# Patient Record
Sex: MALE | Race: WHITE | Employment: UNEMPLOYED | URBAN - METROPOLITAN AREA
[De-identification: names, ages, dates, MRNs, and addresses within clinical notes are randomized per-mention and may not be internally consistent; named-entity substitution may affect disease eponyms.]

---
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
## 2024-08-11 NOTE — PROGRESS NOTES
[facility]  Sleep Medicine Clinic initial consultation note    Roque Marcos                                                Chief complaint: Roque Marcos is a 18 y.o.oldmale came for further evaluation regarding his ?sleep apnea  with referral from Daphne Lehman, APRN - CNP.      [place]:    Sleep/Wake schedule:  Usual time to go to bed during the work/regular day of week: 10 PM  Usual time to wake up during the work//regular day of week: 6:40 AM  Over the weekends his sleep schedule:.  [x]phase delayed.  He sleeps up to 12 to 13 hours on the weekends.  He feels better on the weekends whenever he sleeps long time.  He usually falls a sleep in less than: Less than 20 minutes  He takes naps: No.       Sleep Hygiene:  Is the temperature and evironment in his bed room is acceptable to him: Yes.   He watches Television in his bed room: Yes.   He read books, study, pay bills etc in the bed: No.   Frequency He wake up during night/sleep: 1 time  Majority of nocturnal awakenings are for urination: Yes.   Difficulty in falling back to sleep after nocturnal awakenings: No  .  Do you drink coffee: Yes.  He drinks 1 to 2 cups of coffee per day in the morning.      Do you drink caffeinated beverages i.e sodas: Yes.  He drinks 5 cans of caffeinated soda per day.  He drinks DrPetr Pepper.  Do you drink tea: No.     Do you drink alcoholic beverages: Yes.  He drinks 2 beers per day in the evening.  History of recreational drug use: Yes.  He smokes marijuana on daily basis.  He smokes 1 joint of marijuana per day    Social History     Tobacco Use    Smoking status: Never     Passive exposure: Never    Smokeless tobacco: Never   Vaping Use    Vaping status: Never Used   Substance Use Topics    Alcohol use: Yes    Drug use: Yes     Types: Marijuana (Weed)     Comment: smokes daily       Sleep apnea symptoms:  Noticed to have loud snoring:Yes. Noted by his family

## 2024-09-06 ENCOUNTER — OFFICE VISIT (OUTPATIENT)
Dept: PULMONOLOGY | Age: 19
End: 2024-09-06

## 2024-09-06 VITALS
OXYGEN SATURATION: 98 % | TEMPERATURE: 98.1 F | HEIGHT: 71 IN | BODY MASS INDEX: 23.8 KG/M2 | WEIGHT: 170 LBS | DIASTOLIC BLOOD PRESSURE: 82 MMHG | HEART RATE: 71 BPM | SYSTOLIC BLOOD PRESSURE: 124 MMHG

## 2024-09-06 DIAGNOSIS — G47.10 HYPERSOMNIA: Primary | ICD-10-CM

## 2024-09-06 DIAGNOSIS — R06.83 SNORING: ICD-10-CM

## 2024-09-06 DIAGNOSIS — F12.90 MARIJUANA SMOKER: ICD-10-CM

## 2024-09-06 DIAGNOSIS — R06.81 APNEA: ICD-10-CM

## 2024-09-06 PROCEDURE — 99204 OFFICE O/P NEW MOD 45 MIN: CPT | Performed by: INTERNAL MEDICINE

## 2024-09-06 RX ORDER — VENLAFAXINE HYDROCHLORIDE 37.5 MG/1
112.5 CAPSULE, EXTENDED RELEASE ORAL DAILY
COMMUNITY
Start: 2024-08-05

## 2024-09-06 NOTE — PROGRESS NOTES
Chief Complaint: New patient, referred by Daphne Tomlinson. No priors     Mallampati airway Class: 3  Neck Circumference: 12.5 Inches    Ponce sleepiness score 9/6/24: 7

## 2024-09-06 NOTE — PATIENT INSTRUCTIONS
Recommendations/Plan:  -Will schedule patient for polysomnogram in the sleep lab to evaluate for obstructive VS central sleep apnea due to narcotic medication-induced (he is currently smoking marijuana).   -I had a discussion with patient regarding avialable treatment options for his sleep disorder breathing including but not limited to CPAP titration in the sleep lab Vs.Dental appliance placement with referral to a local dentist Vs other available surgical options including Uvulopalatopharyngoplasty, maxillomandibular ostomy,Inspire device placement and tracheostomy as last option. At the end of discussion, he is not decided on his   treatment if he found to have obstructive sleep apnea at this time.  -We will see Roque Marcos back in 1week after the sleep study to go over the sleep study results and further management options.  -He was educated to practice good sleep hygiene practices. He was provided with a good sleep hygiene hand out.  -Patient educated to quit smoking Marijuana as soon as possible. Patient verbalizes understanding of adverse consequences of Marijuana smoking.  -Roque was advised to make earlier appointment with my clinic if he develops any worsening of sleep symptoms. He verbalizes understanding.  -Roque was advised to not to drive any motor vehicles or operate heavy equipment until his sleep symptoms are under good control.Roque Priti verbalizes understanding.  -Roque Marcos was educated about my impression and plan. He verbalizes understanding.

## 2024-10-10 ENCOUNTER — TELEPHONE (OUTPATIENT)
Dept: SLEEP CENTER | Age: 19
End: 2024-10-10

## 2024-10-10 NOTE — TELEPHONE ENCOUNTER
Please cx Roque's f/u appt.  He cx his sleep study.  He feels his sleep problems have improved.        Thank you,  Siobhan

## 2024-10-10 NOTE — TELEPHONE ENCOUNTER
Attempted to call patient to make sure he would like to cancel follow up but voicemail is full. I cancelled patient's follow up.

## 2025-05-14 ENCOUNTER — APPOINTMENT (OUTPATIENT)
Dept: CT IMAGING | Age: 20
End: 2025-05-14
Payer: COMMERCIAL

## 2025-05-14 ENCOUNTER — HOSPITAL ENCOUNTER (EMERGENCY)
Age: 20
Discharge: HOME | End: 2025-05-15
Payer: COMMERCIAL

## 2025-05-14 DIAGNOSIS — K59.00 CONSTIPATION, UNSPECIFIED CONSTIPATION TYPE: Primary | ICD-10-CM

## 2025-05-14 LAB
BASOPHILS ABSOLUTE: 0 THOU/MM3 (ref 0–0.1)
BASOPHILS NFR BLD AUTO: 0.5 %
DEPRECATED RDW RBC AUTO: 41.8 FL (ref 35–45)
EOSINOPHIL NFR BLD AUTO: 3.5 %
EOSINOPHILS ABSOLUTE: 0.3 THOU/MM3 (ref 0–0.4)
ERYTHROCYTE [DISTWIDTH] IN BLOOD BY AUTOMATED COUNT: 12.7 % (ref 11.5–14.5)
HCT VFR BLD AUTO: 43.2 % (ref 42–52)
HGB BLD-MCNC: 15.4 GM/DL (ref 14–18)
IMM GRANULOCYTES # BLD AUTO: 0.02 THOU/MM3 (ref 0–0.07)
IMM GRANULOCYTES NFR BLD AUTO: 0.3 %
LYMPHOCYTES ABSOLUTE: 2.6 THOU/MM3 (ref 1–4.8)
LYMPHOCYTES NFR BLD AUTO: 33.9 %
MCH RBC QN AUTO: 32 PG (ref 26–33)
MCHC RBC AUTO-ENTMCNC: 35.6 GM/DL (ref 32.2–35.5)
MCV RBC AUTO: 89.6 FL (ref 80–94)
MONOCYTES ABSOLUTE: 0.6 THOU/MM3 (ref 0.4–1.3)
MONOCYTES NFR BLD AUTO: 8.2 %
NEUTROPHILS ABSOLUTE: 4.1 THOU/MM3 (ref 1.8–7.7)
NEUTROPHILS NFR BLD AUTO: 53.6 %
NRBC BLD AUTO-RTO: 0 /100 WBC
PLATELET # BLD AUTO: 245 THOU/MM3 (ref 130–400)
PMV BLD AUTO: 11.5 FL (ref 9.4–12.4)
RBC # BLD AUTO: 4.82 MILL/MM3 (ref 4.7–6.1)
WBC # BLD AUTO: 7.7 THOU/MM3 (ref 4.8–10.8)

## 2025-05-14 PROCEDURE — 74177 CT ABD & PELVIS W/CONTRAST: CPT

## 2025-05-14 PROCEDURE — 83690 ASSAY OF LIPASE: CPT

## 2025-05-14 PROCEDURE — 6360000004 HC RX CONTRAST MEDICATION: Performed by: NURSE PRACTITIONER

## 2025-05-14 PROCEDURE — 85025 COMPLETE CBC W/AUTO DIFF WBC: CPT

## 2025-05-14 PROCEDURE — 96374 THER/PROPH/DIAG INJ IV PUSH: CPT

## 2025-05-14 PROCEDURE — 99285 EMERGENCY DEPT VISIT HI MDM: CPT

## 2025-05-14 PROCEDURE — 80053 COMPREHEN METABOLIC PANEL: CPT

## 2025-05-14 PROCEDURE — 6360000002 HC RX W HCPCS: Performed by: NURSE PRACTITIONER

## 2025-05-14 PROCEDURE — 82248 BILIRUBIN DIRECT: CPT

## 2025-05-14 PROCEDURE — 2580000003 HC RX 258: Performed by: NURSE PRACTITIONER

## 2025-05-14 PROCEDURE — 36415 COLL VENOUS BLD VENIPUNCTURE: CPT

## 2025-05-14 RX ORDER — ONDANSETRON 2 MG/ML
4 INJECTION INTRAMUSCULAR; INTRAVENOUS ONCE
Status: COMPLETED | OUTPATIENT
Start: 2025-05-14 | End: 2025-05-14

## 2025-05-14 RX ORDER — IOPAMIDOL 755 MG/ML
80 INJECTION, SOLUTION INTRAVASCULAR
Status: COMPLETED | OUTPATIENT
Start: 2025-05-14 | End: 2025-05-14

## 2025-05-14 RX ORDER — 0.9 % SODIUM CHLORIDE 0.9 %
1000 INTRAVENOUS SOLUTION INTRAVENOUS ONCE
Status: COMPLETED | OUTPATIENT
Start: 2025-05-14 | End: 2025-05-15

## 2025-05-14 RX ADMIN — ONDANSETRON 4 MG: 2 INJECTION, SOLUTION INTRAMUSCULAR; INTRAVENOUS at 23:42

## 2025-05-14 RX ADMIN — SODIUM CHLORIDE 1000 ML: 0.9 INJECTION, SOLUTION INTRAVENOUS at 23:43

## 2025-05-14 RX ADMIN — IOPAMIDOL 80 ML: 755 INJECTION, SOLUTION INTRAVENOUS at 23:27

## 2025-05-14 ASSESSMENT — PAIN DESCRIPTION - ORIENTATION: ORIENTATION: LOWER

## 2025-05-14 ASSESSMENT — PAIN SCALES - GENERAL: PAINLEVEL_OUTOF10: 6

## 2025-05-14 ASSESSMENT — PAIN DESCRIPTION - LOCATION: LOCATION: ABDOMEN

## 2025-05-14 ASSESSMENT — PAIN - FUNCTIONAL ASSESSMENT: PAIN_FUNCTIONAL_ASSESSMENT: 0-10

## 2025-05-15 VITALS
SYSTOLIC BLOOD PRESSURE: 136 MMHG | HEART RATE: 84 BPM | DIASTOLIC BLOOD PRESSURE: 80 MMHG | BODY MASS INDEX: 21 KG/M2 | RESPIRATION RATE: 17 BRPM | HEIGHT: 71 IN | WEIGHT: 150 LBS | OXYGEN SATURATION: 95 % | TEMPERATURE: 98.2 F

## 2025-05-15 LAB
ALBUMIN SERPL BCG-MCNC: 4.6 G/DL (ref 3.4–4.9)
ALP SERPL-CCNC: 111 U/L (ref 40–129)
ALT SERPL W/O P-5'-P-CCNC: 49 U/L (ref 10–50)
ANION GAP SERPL CALC-SCNC: 11 MEQ/L (ref 8–16)
AST SERPL-CCNC: 41 U/L (ref 10–50)
BACTERIA URNS QL MICRO: ABNORMAL /HPF
BILIRUB CONJ SERPL-MCNC: 0.2 MG/DL (ref 0–0.2)
BILIRUB SERPL-MCNC: 0.5 MG/DL (ref 0.3–1.2)
BILIRUB UR QL STRIP.AUTO: NEGATIVE
BUN SERPL-MCNC: 14 MG/DL (ref 8–23)
CALCIUM SERPL-MCNC: 9.3 MG/DL (ref 8.6–10)
CASTS #/AREA URNS LPF: ABNORMAL /LPF
CASTS 2: ABNORMAL /LPF
CHARACTER UR: ABNORMAL
CHLORIDE SERPL-SCNC: 100 MEQ/L (ref 98–111)
CO2 SERPL-SCNC: 28 MEQ/L (ref 22–29)
COLOR, UA: YELLOW
CREAT SERPL-MCNC: 1.1 MG/DL (ref 0.7–1.2)
CRYSTALS URNS MICRO: ABNORMAL
EPITHELIAL CELLS, UA: ABNORMAL /HPF
GFR SERPL CREATININE-BSD FRML MDRD: > 90 ML/MIN/1.73M2
GLUCOSE SERPL-MCNC: 105 MG/DL (ref 74–109)
GLUCOSE UR QL STRIP.AUTO: NEGATIVE MG/DL
HGB UR QL STRIP.AUTO: NEGATIVE
KETONES UR QL STRIP.AUTO: NEGATIVE
LIPASE SERPL-CCNC: 27 U/L (ref 13–60)
MISCELLANEOUS 2: ABNORMAL
NITRITE UR QL STRIP: NEGATIVE
OSMOLALITY SERPL CALC.SUM OF ELEC: 278.4 MOSMOL/KG (ref 275–300)
PH UR STRIP.AUTO: 8.5 [PH] (ref 5–9)
POTASSIUM SERPL-SCNC: 3.6 MEQ/L (ref 3.5–5.2)
PROT SERPL-MCNC: 7.3 G/DL (ref 6.4–8.3)
PROT UR STRIP.AUTO-MCNC: NEGATIVE MG/DL
RBC URINE: ABNORMAL /HPF
RENAL EPI CELLS #/AREA URNS HPF: ABNORMAL /[HPF]
SODIUM SERPL-SCNC: 139 MEQ/L (ref 135–145)
SP GR UR REFRACT.AUTO: > 1.03 (ref 1–1.03)
UROBILINOGEN, URINE: 1 EU/DL (ref 0–1)
WBC #/AREA URNS HPF: ABNORMAL /HPF
WBC #/AREA URNS HPF: NEGATIVE /[HPF]
YEAST LIKE FUNGI URNS QL MICRO: ABNORMAL

## 2025-05-15 PROCEDURE — 6370000000 HC RX 637 (ALT 250 FOR IP): Performed by: NURSE PRACTITIONER

## 2025-05-15 PROCEDURE — 81001 URINALYSIS AUTO W/SCOPE: CPT

## 2025-05-15 RX ORDER — LACTULOSE 10 G/15ML
10-20 SOLUTION ORAL 2 TIMES DAILY PRN
Qty: 300 ML | Refills: 0 | Status: SHIPPED | OUTPATIENT
Start: 2025-05-15

## 2025-05-15 RX ADMIN — MAJOR MAGNESIUM CITRATE ORAL SOLUTION - LEMON 296 ML: 1.75 LIQUID ORAL at 01:26

## 2025-05-15 NOTE — ED NOTES
Urine sample obtained. Respirations even and unlabored. Call light within reach. Will monitor. No further needs voiced.

## 2025-05-15 NOTE — DISCHARGE INSTRUCTIONS
Take your medication as indicated and prescribed.  Take either milk of magnesium or 1/2 bottle of magnesium citrate if no bowel movement.  Eat foods high in fiber, or start using fiber supplements.    PLEASE RETURN TO THE EMERGENCY DEPARTMENT IMMEDIATELY for worsening symptoms, inability to have a bowel movement or if you develop any concerning symptoms such as: high fever not relieved by acetaminophen (Tylenol) and/or ibuprofen (Motrin / Advil), chills, shortness of breath, chest pain, feeling of your heart fluttering or racing, persistent nausea and/or vomiting, vomiting up blood, blood in your stool, numbness, loss of consciousness, weakness or tingling in the arms or legs or change in color of the extremities, changes in mental status, persistent headache, blurry vision, loss of bladder / bowel control, unable to follow up with your physician, or other any other care or concern.  .

## 2025-05-15 NOTE — ED TRIAGE NOTES
Pt reports to the ED due to nuasea and constipation. Pt reports symptoms starting a couple days ago and have gotten worse. Pt reports nausea with pain in abdomen rating pain 6/10 at worst but describing pain as coming and going. Pain located in lower abdomen and unable to state if is left or right side. Pt last bowel movement earlier today but was small. Pt reports passing gas but states is not normal. INT established.

## 2025-05-15 NOTE — ED PROVIDER NOTES
[facility] EMERGENCY DEPARTMENT      EMERGENCY MEDICINE     Pt Name: Roque Marcos  MRN: [id number]  Birthdate 2005  Date of evaluation: 5/14/2025  Provider: AMALIA Rolle CNP    CHIEF COMPLAINT       Chief Complaint   Patient presents with    Constipation    Nausea     HISTORY OF PRESENT ILLNESS   Roque Marcos is a pleasant 19 y.o. male who presents to the emergency department from home with c/o abdominal bloating,discomfort, cramping and constipation.  It is causing him to be nauseated and not feel like eating.  No fever.  Has hx of constipation      History is obtained from:  patient  PASTMEDICAL HISTORY   History reviewed. No pertinent past medical history.    There is no problem list on file for this patient.    SURGICAL HISTORY     History reviewed. No pertinent surgical history.    CURRENT MEDICATIONS       Discharge Medication List as of 5/15/2025  1:04 AM        CONTINUE these medications which have NOT CHANGED    Details   venlafaxine (EFFEXOR XR) 37.5 MG extended release capsule Take 3 capsules by mouth dailyHistorical Med      busPIRone (BUSPAR) 10 MG tablet Take 1 tablet by mouth 3 times dailyHistorical Med             ALLERGIES     has no known allergies.    FAMILY HISTORY     has no family status information on file.        SOCIAL HISTORY       Social History     Tobacco Use    Smoking status: Never     Passive exposure: Never    Smokeless tobacco: Never   Vaping Use    Vaping status: Never Used   Substance Use Topics    Alcohol use: Yes    Drug use: Yes     Types: Marijuana (Weed)     Comment: smokes daily       PHYSICAL EXAM       ED Triage Vitals [05/14/25 2302]   BP Systolic BP Percentile Diastolic BP Percentile Temp Temp Source Pulse Respirations SpO2   (!) 144/103 -- -- 98.2 °F (36.8 °C) Oral (!) 124 18 99 %      Height Weight - Scale         1.803 m (5' 11\") 68 kg (150 lb)             Physical Exam  Vitals and nursing note reviewed.   Constitutional:        as of 5/15/2025  1:04 AM        START taking these medications    Details   lactulose (CHRONULAC) 10 GM/15ML solution Take 15-30 mLs by mouth 2 times daily as needed (constipation), Disp-300 mL, R-0Normal             FINAL IMPRESSION      1. Constipation, unspecified constipation type          DISPOSITION/PLAN   DISPOSITION Decision To Discharge 05/15/2025 12:52:26 AM   DISPOSITION CONDITION Stable           OUTPATIENT FOLLOW UP THE PATIENT:  Daphne Tomlinson APRN - CNP  [address] [facility] [address] [phone]    Schedule an appointment as soon as possible for a visit in 2 days  For follow up      AMALIA Rolle CNP, Kristy J, APRN - CNP  05/15/25 0541

## 2025-06-21 ENCOUNTER — HOSPITAL ENCOUNTER (EMERGENCY)
Age: 20
Discharge: HOME | End: 2025-06-21
Attending: EMERGENCY MEDICINE

## 2025-06-21 ENCOUNTER — APPOINTMENT (OUTPATIENT)
Dept: ULTRASOUND IMAGING | Age: 20
End: 2025-06-21

## 2025-06-21 VITALS
OXYGEN SATURATION: 99 % | TEMPERATURE: 98 F | HEART RATE: 76 BPM | SYSTOLIC BLOOD PRESSURE: 113 MMHG | DIASTOLIC BLOOD PRESSURE: 65 MMHG | RESPIRATION RATE: 19 BRPM

## 2025-06-21 DIAGNOSIS — I86.1 VARICOCELE: ICD-10-CM

## 2025-06-21 DIAGNOSIS — N50.811 PAIN IN RIGHT TESTICLE: Primary | ICD-10-CM

## 2025-06-21 LAB
BACTERIA URNS QL MICRO: ABNORMAL /HPF
BILIRUB UR QL STRIP.AUTO: NEGATIVE
CASTS #/AREA URNS LPF: ABNORMAL /LPF
CASTS 2: ABNORMAL /LPF
CHARACTER UR: CLEAR
COLOR, UA: YELLOW
CRYSTALS URNS MICRO: ABNORMAL
EPITHELIAL CELLS, UA: ABNORMAL /HPF
GLUCOSE UR QL STRIP.AUTO: NEGATIVE MG/DL
HGB UR QL STRIP.AUTO: NEGATIVE
KETONES UR QL STRIP.AUTO: NEGATIVE
MISCELLANEOUS 2: ABNORMAL
NITRITE UR QL STRIP: NEGATIVE
PH UR STRIP.AUTO: 6.5 [PH] (ref 5–9)
PROT UR STRIP.AUTO-MCNC: 30 MG/DL
RBC URINE: ABNORMAL /HPF
RENAL EPI CELLS #/AREA URNS HPF: ABNORMAL /[HPF]
SP GR UR REFRACT.AUTO: 1.02 (ref 1–1.03)
UROBILINOGEN, URINE: 1 EU/DL (ref 0–1)
WBC #/AREA URNS HPF: ABNORMAL /HPF
WBC #/AREA URNS HPF: NEGATIVE /[HPF]
YEAST LIKE FUNGI URNS QL MICRO: ABNORMAL

## 2025-06-21 PROCEDURE — 81001 URINALYSIS AUTO W/SCOPE: CPT

## 2025-06-21 PROCEDURE — 76870 US EXAM SCROTUM: CPT

## 2025-06-21 PROCEDURE — 99284 EMERGENCY DEPT VISIT MOD MDM: CPT

## 2025-06-21 ASSESSMENT — PAIN SCALES - GENERAL: PAINLEVEL_OUTOF10: 5

## 2025-06-21 ASSESSMENT — PAIN - FUNCTIONAL ASSESSMENT: PAIN_FUNCTIONAL_ASSESSMENT: 0-10

## 2025-06-21 NOTE — ED NOTES
Pt arrives to ED from home with c/o testicular pain  Pt states after \"fucking\" he began to experience right side testicular pain and pain is anus as well. He states the ride side feels different the left

## 2025-06-21 NOTE — ED NOTES
RN to room to round on pt   UA collected  No further questions or concerns at this time  Patient resting in bed. Respirations easy and unlabored. No distress noted. Call light within reach.

## 2025-06-21 NOTE — ED PROVIDER NOTES
[facility] EMERGENCY SERVICES ENCOUNTER        PATIENT NAME: Roque Marcos  MRN: [id number]  : 2005  [place]: 2025  PROVIDER: Mumtaz Shi MD    Patient was seen and evaluated at 3:52 PM EDT. Nurses Notes are reviewed and I agree except as noted in the HPI.  Chief Complaint   Patient presents with    Groin Swelling     HISTORY OF PRESENT ILLNESS     A 19-year-old otherwise healthy male presents with a right testicle pain after intercourse today. Pain radiates to right sided perineal area.  No testicle or scrotal swelling.  No urethral discharge.  No hematuria or dysuria.  He denies prior history of STI.    Patient's additional PMH, PSH, current medications, allergies, FH and SH are reviewed as below.     PAST MEDICAL HISTORY    has no past medical history on file.    PAST SURGICAL HISTORY    has no past surgical history on file.    CURRENT MEDICATIONS       Previous Medications    BUSPIRONE (BUSPAR) 10 MG TABLET    Take 1 tablet by mouth 3 times daily    LACTULOSE (CHRONULAC) 10 GM/15ML SOLUTION    Take 15-30 mLs by mouth 2 times daily as needed (constipation)    VENLAFAXINE (EFFEXOR XR) 37.5 MG EXTENDED RELEASE CAPSULE    Take 3 capsules by mouth daily       ALLERGIES     has no known allergies.    FAMILY HISTORY   has no family status information on file.    family history is not on file.    SOCIAL HISTORY    reports that he has never smoked. He has never been exposed to tobacco smoke. He has never used smokeless tobacco. He reports current alcohol use. He reports current drug use. Drug: Marijuana (Weed).    PHYSICAL EXAM      oral temperature is 98 °F (36.7 °C). His blood pressure is 113/65 and his pulse is 76. His respiration is 19 and oxygen saturation is 99%.   Physical Exam  Vitals and nursing note reviewed.   Constitutional:       Appearance: Normal appearance. He is well-developed.   HENT:      Head: Normocephalic and atraumatic.      Nose: Nose normal.